# Patient Record
Sex: MALE | Race: WHITE | NOT HISPANIC OR LATINO | Employment: FULL TIME | URBAN - METROPOLITAN AREA
[De-identification: names, ages, dates, MRNs, and addresses within clinical notes are randomized per-mention and may not be internally consistent; named-entity substitution may affect disease eponyms.]

---

## 2021-05-29 ENCOUNTER — RECORDS - HEALTHEAST (OUTPATIENT)
Dept: ADMINISTRATIVE | Facility: CLINIC | Age: 31
End: 2021-05-29

## 2022-04-19 ENCOUNTER — NURSE TRIAGE (OUTPATIENT)
Dept: NURSING | Facility: CLINIC | Age: 32
End: 2022-04-19
Payer: COMMERCIAL

## 2022-04-19 NOTE — TELEPHONE ENCOUNTER
Pt calling to find out how he can get stitches removed, he's 2 days overdue for removal.  It's not covered under his insurance plan to go back to the ED for removal so he's hoping to schedule with [facility].      Call warm transferred to scheduling.      Ivy Reed RN  [facility] Nurse Advisor      Reason for Disposition    Requesting regular office appointment    Additional Information    Negative: RN needs further essential information from caller in order to complete triage    Negative: [1] Caller is not with the adult (patient) AND [2] reporting urgent symptoms    Negative: Lab result questions    Negative: Medication questions    Negative: Caller can't be reached by phone    Negative: Caller has already spoken to PCP or another triager    Protocols used: INFORMATION ONLY CALL - NO TRIAGE-A-

## 2022-04-20 ENCOUNTER — OFFICE VISIT (OUTPATIENT)
Dept: FAMILY MEDICINE | Facility: CLINIC | Age: 32
End: 2022-04-20
Payer: COMMERCIAL

## 2022-04-20 VITALS
HEIGHT: 75 IN | HEART RATE: 79 BPM | SYSTOLIC BLOOD PRESSURE: 120 MMHG | WEIGHT: 253.6 LBS | DIASTOLIC BLOOD PRESSURE: 78 MMHG | RESPIRATION RATE: 16 BRPM | BODY MASS INDEX: 31.53 KG/M2

## 2022-04-20 DIAGNOSIS — Z48.02 VISIT FOR SUTURE REMOVAL: Primary | ICD-10-CM

## 2022-04-20 PROCEDURE — 99202 OFFICE O/P NEW SF 15 MIN: CPT | Performed by: FAMILY MEDICINE

## 2022-04-20 NOTE — PROGRESS NOTES
"Assessment/ Plan     1. Visit for suture removal  Warren presents to the clinic as a new patient to have sutures removed.  These were placed approximately 2 weeks ago at the emergency room.  Proximately 8 interrupted sutures were removed today without difficulty.  Discussed wound cares.  Bandage was placed.      Subjective:      Warren Rock is a 31 year old male who presents for suture removal.  He is new to the clinic today, does not have a primary.  He states about 2 weeks ago he was reaching into his  when he cut the back of his right fourth finger on a knife.  He was seen in the emergency room and they placed stitches.  They put him in a splint for protection.  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  He feels like its been healing well.  He has no underlying medical conditions.    Relevant past medical, family, surgical, and social history reviewed with patient, unless noted in HPI, not pertinent for this visit.  Medications were discussed and reconciled.   Review of Systems   A 12 point comprehensive review of systems was negative except as noted.      No current outpatient medications on file.         Objective:     /78   Pulse 79   Resp 16   Ht 1.905 m (6' 3\")   Wt 115 kg (253 lb 9.6 oz)   BMI 31.70 kg/m      Body mass index is 31.7 kg/m .       General appearance: alert, appears stated age and cooperative  Hand: Approximate 4 cm laceration across the dorsal surface of the right fourth finger.  This does cross over the PIP joint.  It appears well-healed.  No signs of infection.    Procedure: After verbal consent, approximately 8 interrupted sutures were removed using a pickup and a suture scissors.  I then placed some Steri-Strips and a bandage.         No results found for this or any previous visit (from the past 168 hour(s)).       This note has been dictated using voice recognition software. Any grammatical or context distortions are unintentional and " inherent to the software  Answers for HPI/ROS submitted by the patient on 4/20/2022  How many servings of fruits and vegetables do you eat daily?: 0-1  On average, how many sweetened beverages do you drink each day (Examples: soda, juice, sweet tea, etc.  Do NOT count diet or artificially sweetened beverages)?: 1  How many minutes a day do you exercise enough to make your heart beat faster?: 30 to 60  How many days a week do you exercise enough to make your heart beat faster?: 5  How many days per week do you miss taking your medication?: 0  What is the reason for your visit today?: Remove stitches from right ring finger.